# Patient Record
Sex: FEMALE | URBAN - METROPOLITAN AREA
[De-identification: names, ages, dates, MRNs, and addresses within clinical notes are randomized per-mention and may not be internally consistent; named-entity substitution may affect disease eponyms.]

---

## 2017-04-13 ENCOUNTER — TRANSFERRED RECORDS (OUTPATIENT)
Dept: HEALTH INFORMATION MANAGEMENT | Facility: CLINIC | Age: 42
End: 2017-04-13

## 2017-04-17 ENCOUNTER — TRANSFERRED RECORDS (OUTPATIENT)
Dept: HEALTH INFORMATION MANAGEMENT | Facility: CLINIC | Age: 42
End: 2017-04-17

## 2017-05-08 DIAGNOSIS — H53.2 DOUBLE VISION: Primary | ICD-10-CM

## 2017-05-10 ENCOUNTER — OFFICE VISIT (OUTPATIENT)
Dept: OPHTHALMOLOGY | Facility: CLINIC | Age: 42
End: 2017-05-10
Attending: OPHTHALMOLOGY
Payer: COMMERCIAL

## 2017-05-10 DIAGNOSIS — H50.22 HYPERTROPIA OF LEFT EYE: Primary | ICD-10-CM

## 2017-05-10 DIAGNOSIS — H53.2 DOUBLE VISION: ICD-10-CM

## 2017-05-10 PROCEDURE — 92015 DETERMINE REFRACTIVE STATE: CPT | Mod: ZF

## 2017-05-10 PROCEDURE — 99214 OFFICE O/P EST MOD 30 MIN: CPT | Mod: ZF

## 2017-05-10 PROCEDURE — 92060 SENSORIMOTOR EXAMINATION: CPT | Mod: 59,ZF | Performed by: OPHTHALMOLOGY

## 2017-05-10 ASSESSMENT — TONOMETRY
IOP_METHOD: ICARE
OD_IOP_MMHG: 15
OS_IOP_MMHG: 12

## 2017-05-10 ASSESSMENT — REFRACTION_WEARINGRX
OD_AXIS: 077
OS_VBASE: DOWN
OD_CYLINDER: +1.00
OS_VBASE: DOWN
OD_SPHERE: +2.50
OS_AXIS: 102
OD_VPRISM: 3.0
SPECS_TYPE: SVL
OS_CYLINDER: +1.25
OS_VPRISM: 2.0
OD_VBASE: UP
OD_SPHERE: +2.75
OS_SPHERE: +3.50
OS_SPHERE: +3.25
OS_CYLINDER: +1.25
OD_VBASE: UP
OS_VPRISM: 1.0
OD_VPRISM: 2.0
OS_AXIS: 100
OD_CYLINDER: +1.00
OD_AXIS: 095

## 2017-05-10 ASSESSMENT — VISUAL ACUITY
OD_CC: 20/20
OS_CC: 20/25+2
METHOD: SNELLEN - LINEAR
OD_CC: 20/20-1
METHOD: SNELLEN - LINEAR
OS_CC: 20/20-1

## 2017-05-10 ASSESSMENT — SLIT LAMP EXAM - LIDS
COMMENTS: NORMAL
COMMENTS: NORMAL

## 2017-05-10 ASSESSMENT — EXTERNAL EXAM - RIGHT EYE: OD_EXAM: NORMAL

## 2017-05-10 ASSESSMENT — EXTERNAL EXAM - LEFT EYE: OS_EXAM: NORMAL

## 2017-05-10 ASSESSMENT — REFRACTION_MANIFEST
OD_SPHERE: +2.75
OS_SPHERE: +3.25
OD_CYLINDER: +1.00
OD_AXIS: 085
OS_AXIS: 100
OS_CYLINDER: +1.25

## 2017-05-10 ASSESSMENT — CUP TO DISC RATIO
OS_RATIO: 0.1
OD_RATIO: 0.1

## 2017-05-10 ASSESSMENT — CONF VISUAL FIELD
METHOD: COUNTING FINGERS
OS_NORMAL: 1
OD_NORMAL: 1

## 2017-05-10 NOTE — LETTER
05/10/2017      To Whom It May Concern,     I am writing to you on behalf of my patient, Yoon Morrissey (1975) whom I examined in my office today 05/10/2017. Please excuse her from work for today. If you have any further questions please contact my office.    Sincerely,         Royal Cortez MD  , Neuro-ophthalmology and Adult Strabismus  Department of Ophthalmology and Visual Neurosciences

## 2017-05-10 NOTE — MR AVS SNAPSHOT
After Visit Summary   5/10/2017    Yoon Morrissey    MRN: [id number]           Patient Information     Date Of Birth          1975        Visit Information        Provider Department      5/10/2017 1:30 PM Royal Cortez MD [facility]        Today's Diagnoses     Hypertropia of left eye    -  1    Double vision           Follow-ups after your visit        Who to contact     Please call your clinic at [phone] to:    Ask questions about your health    Make or cancel appointments    Discuss your medicines    Learn about your test results    Speak to your doctor   If you have compliments or concerns about an experience at your clinic, or if you wish to file a complaint, please contact [facility] Patient Relations at [phone] or email us at [email or web address]         Additional Information About Your Visit        MyChart Information     [email or web address] gives you secure access to your electronic health record. If you see a primary care provider, you can also send messages to your care team and make appointments. If you have questions, please call your primary care clinic.  If you do not have a primary care provider, please call [phone] and they will assist you.      ProQuo is an electronic gateway that provides easy, online access to your medical records. With Pro[employer], you can request a clinic appointment, read your test results, renew a prescription or communicate with your care team.     To access your existing account, please contact your [facility] or call [phone] for assistance.        Care EveryWhere ID     This is your Care EveryWhere ID. This could be used by other organizations to access your [facility] medical records  [id number]         Blood Pressure from Last 3 Encounters:   No data found for BP    Weight from Last 3 Encounters:   No data found for Wt              We Performed the  Following     IOP Measurement     Sensorimotor        Primary Care Provider Office Phone # Fax #    Edda Sung [phone]       [address] [address]        Thank you!     Thank you for choosing [facility]  for your care. Our goal is always to provide you with excellent care. Hearing back from our patients is one way we can continue to improve our services. Please take a few minutes to complete the written survey that you may receive in the mail after your visit with us. Thank you!             Your Updated Medication List - Protect others around you: Learn how to safely use, store and throw away your medicines at www.disposemymeds.org.      Notice  As of 5/10/2017 11:59 PM    You have not been prescribed any medications.

## 2017-05-10 NOTE — LETTER
May 18, 2017    RE: Yoon Morrissey  : 1975  MRN: [id number]    Dear Dr. Baez    Thank you for referring your patient, Yoon Morrissey, to my neuro-ophthalmology clinic recently.  After a thorough neuro-ophthalmic history and examination, I came to the following conclusions:     1. 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 Baez for evaluation of strabismus that has been going on since her early childhood. She has a history of childhood partially accommodative esotropia.  Her esotropia was not entirely correctable with glasses, however, and she had strabismus surgery at the age of 7 years.  She has worn prism glasses for years and old records indicate she has worn correction for a small angle left hypertropia for years (depending on the glasses either 3 prism diopters vertical correction or 4 total).  Recently she had an updated prescription for glasses which seemed to make her vision worse.  She does not describe her subjective visual disturbance as diplopia, however, instead describing it as looking through a fishbowl.  Of note, there was no major change in her prism with her recent glasses.  The fishbowl vision is still present under monocular conditions.    Today the visual acuity is 20/20 in both eyes with glasses. No afferent pupillary defect. Color vision was normal bilaterally. Extraocular motility was full. Cover testing done while she was wearing her correction without prismatic correction showed very small left hypertropia measuring 3 prism diopters in all gaze positions (both with alternate cover testing and with single Ruiz abbie testing). Slit lamp examination was unremarkable. Dilated fundus exam was unremarkable in both eyes. Cranial nerves were unremarkable    In  conclusion, Ms. Ballard presents today for evaluation. Her examination today is remarkable for small left hypertropia.  She has had an ill-defined subjective visual disturbance since changing to new glasses despite no change in her prism correction nor changes in her measured longstanding strabismus.  The subjective visual disturbance is still present under monocular conditions.  She has seen multiple providers including multiple skilled optometrists and has been unable to find a new pair of glasses she likes.  She has worn her new glasses for weeks and has been unable to adapt to the new glasses with time.    Unfortunately I don't have an answer in this case.  I don't believe this is a strabismus issue given that her strabismus and prism correction are unchanged from her prior prescriptions.  I suspect (though can't prove) that there must be some inherent change in how the glasses were made or how they fit that she does not like.  Today in clinic with testing she seemed to appreciate either 3 or 4 prism diopters of vertical correction for her left hypertropia.  I would suggest the patient continue to work with optometry to try to identify what about her new glasses she can't tolerate.    I did not make a follow-up appointment, but I would be happy to see the patient back in the future should any new neuro-ophthalmic concern arise.      Again, thank you for trusting me with the care of your patient.  For further exam details, please feel free to contact our office for additional records.  If you wish to contact me regarding this patient please email me at [email or web address] or give my clinic a call to arrange a phone conversation.    Sincerely,    Royal Cortez MD  , Neuro-Ophthalmology and Adult Strabismus  Department of Ophthalmology and Visual Neurosciences  [facility]    DX: subjective visual disturbance, left hypertropia, congenital esotropia

## 2017-05-10 NOTE — NURSING NOTE
"Chief Complaints and History of Present Illnesses   Patient presents with     Diplopia Evaluation     HPI    Affected eye(s):  Both   Symptoms:     No decreased vision   Difficulty with reading   Double vision         Do you have eye pain now?:  No      Comments:  H/o strab surgery BE age 7. Has had prism in glasses since childhood. Occ diplopia now, comes and goes. Hard time getting good glasses RX, like in a \"fish bowl\". Doesn't like new glasses.     Josie CARRASCO May 10, 2017 1:02 PM               "

## 2017-05-11 NOTE — PROGRESS NOTES
ATTENDING ASSESSMENT AND PLAN:       1. 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 Baez for evaluation of strabismus that has been going on since her early childhood. She has a history of childhood partially accommodative esotropia.  Her esotropia was not entirely correctable with glasses, however, and she had strabismus surgery at the age of 7 years.  She has worn prism glasses for years and old records indicate she has worn correction for a small angle left hypertropia for years (depending on the glasses either 3 prism diopters vertical correction or 4 total).  Recently she had an updated prescription for glasses which seemed to make her vision worse.  She does not describe her subjective visual disturbance as diplopia, however, instead describing it as looking through a fishbowl.  Of note, there was no major change in her prism with her recent glasses.  The fishbowl vision is still present under monocular conditions.    Today the visual acuity is 20/20 in both eyes with glasses. No afferent pupillary defect. Color vision was normal bilaterally. Extraocular motility was full. Cover testing done while she was wearing her correction without prismatic correction showed very small left hypertropia measuring 3 prism diopters in all gaze positions (both with alternate cover testing and with single Ruiz abbie testing). Slit lamp examination was unremarkable. Dilated fundus exam was unremarkable in both eyes. Cranial nerves were unremarkable    In conclusion, Ms. Ballard presents today for evaluation. Her examination today is remarkable for small left hypertropia.  She has had an ill-defined subjective visual disturbance since changing to new glasses despite no change in her prism correction nor changes in her measured  longstanding strabismus.  The subjective visual disturbance is still present under monocular conditions.  She has seen multiple providers including multiple skilled optometrists and has been unable to find a new pair of glasses she likes.  She has worn her new glasses for weeks and has been unable to adapt to the new glasses with time.    Unfortunately I don't have an answer in this case.  I don't believe this is a strabismus issue given that her strabismus and prism correction are unchanged from her prior prescriptions.  I suspect (though can't prove) that there must be some inherent change in how the glasses were made or how they fit that she does not like.  Today in clinic with testing she seemed to appreciate either 3 or 4 prism diopters of vertical correction for her left hypertropia.  I would suggest the patient continue to work with optometry to try to identify what about her new glasses she can't tolerate.    I did not make a follow-up appointment, but I would be happy to see the patient back in the future should any new neuro-ophthalmic concern arise.           Complete documentation of historical and exam elements from today's encounter can be found in the full encounter summary report (not reduplicated in this progress note).  I personally obtained the chief complaint(s) and history of present illness.  I confirmed and edited as necessary the review of systems, past medical/surgical history, family history, social history, and examination findings as documented by others; and I examined the patient myself.  I personally reviewed the relevant tests, images, and reports as documented above.  I formulated and edited as necessary the assessment and plan and discussed the findings and management plan with the patient and family   Royal Cortez MD  7:16 PM  5/10/2017

## 2018-01-21 ENCOUNTER — HEALTH MAINTENANCE LETTER (OUTPATIENT)
Age: 43
End: 2018-01-21

## 2020-03-10 ENCOUNTER — HEALTH MAINTENANCE LETTER (OUTPATIENT)
Age: 45
End: 2020-03-10

## 2020-12-27 ENCOUNTER — HEALTH MAINTENANCE LETTER (OUTPATIENT)
Age: 45
End: 2020-12-27

## 2021-04-24 ENCOUNTER — HEALTH MAINTENANCE LETTER (OUTPATIENT)
Age: 46
End: 2021-04-24

## 2021-10-09 ENCOUNTER — HEALTH MAINTENANCE LETTER (OUTPATIENT)
Age: 46
End: 2021-10-09

## 2022-05-21 ENCOUNTER — HEALTH MAINTENANCE LETTER (OUTPATIENT)
Age: 47
End: 2022-05-21

## 2022-09-11 ENCOUNTER — HEALTH MAINTENANCE LETTER (OUTPATIENT)
Age: 47
End: 2022-09-11

## 2023-06-03 ENCOUNTER — HEALTH MAINTENANCE LETTER (OUTPATIENT)
Age: 48
End: 2023-06-03